# Patient Record
Sex: FEMALE | Race: WHITE | NOT HISPANIC OR LATINO | Employment: FULL TIME | URBAN - METROPOLITAN AREA
[De-identification: names, ages, dates, MRNs, and addresses within clinical notes are randomized per-mention and may not be internally consistent; named-entity substitution may affect disease eponyms.]

---

## 2019-05-07 ENCOUNTER — HISTORICAL (OUTPATIENT)
Dept: LAB | Facility: HOSPITAL | Age: 29
End: 2019-05-07

## 2019-05-07 LAB — B-HCG SERPL QL: NEGATIVE

## 2019-06-10 ENCOUNTER — HISTORICAL (OUTPATIENT)
Dept: LAB | Facility: HOSPITAL | Age: 29
End: 2019-06-10

## 2019-06-10 LAB
ABS NEUT (OLG): 3.64 X10(3)/MCL (ref 2.1–9.2)
ALBUMIN SERPL-MCNC: 3.7 GM/DL (ref 3.4–5)
ALBUMIN/GLOB SERPL: 1.4 RATIO (ref 1.1–2)
ALP SERPL-CCNC: 62 UNIT/L (ref 38–126)
ALT SERPL-CCNC: 24 UNIT/L (ref 12–78)
B-HCG SERPL QL: NEGATIVE
BASOPHILS # BLD AUTO: 0 X10(3)/MCL (ref 0–0.2)
BASOPHILS NFR BLD AUTO: 0 %
BILIRUB SERPL-MCNC: 0.3 MG/DL (ref 0.2–1)
BILIRUBIN DIRECT+TOT PNL SERPL-MCNC: 0.1 MG/DL (ref 0–0.5)
BILIRUBIN DIRECT+TOT PNL SERPL-MCNC: 0.2 MG/DL (ref 0–0.8)
BUN SERPL-MCNC: 13 MG/DL (ref 7–18)
CALCIUM SERPL-MCNC: 8.9 MG/DL (ref 8.5–10.1)
CHLORIDE SERPL-SCNC: 108 MMOL/L (ref 98–107)
CHOLEST SERPL-MCNC: 135 MG/DL (ref 0–200)
CK SERPL-CCNC: 354 UNIT/L (ref 26–192)
CO2 SERPL-SCNC: 25 MMOL/L (ref 21–32)
CREAT SERPL-MCNC: 0.88 MG/DL (ref 0.55–1.02)
CREAT/UREA NIT SERPL: 14.8
EOSINOPHIL # BLD AUTO: 0.3 X10(3)/MCL (ref 0–0.9)
EOSINOPHIL NFR BLD AUTO: 5 %
ERYTHROCYTE [DISTWIDTH] IN BLOOD BY AUTOMATED COUNT: 14.1 % (ref 11.5–17)
GGT SERPL-CCNC: 11 UNIT/L (ref 5–85)
GLOBULIN SER-MCNC: 2.6 GM/DL (ref 2.4–3.5)
GLUCOSE SERPL-MCNC: 78 MG/DL (ref 74–106)
HCT VFR BLD AUTO: 39.3 % (ref 37–47)
HGB BLD-MCNC: 12.2 GM/DL (ref 12–16)
IRON SERPL-MCNC: 149 MCG/DL (ref 50–175)
LDH SERPL-CCNC: 218 UNIT/L (ref 84–246)
LYMPHOCYTES # BLD AUTO: 1.7 X10(3)/MCL (ref 0.6–4.6)
LYMPHOCYTES NFR BLD AUTO: 28 %
MCH RBC QN AUTO: 29 PG (ref 27–31)
MCHC RBC AUTO-ENTMCNC: 31 GM/DL (ref 33–36)
MCV RBC AUTO: 93.6 FL (ref 80–94)
MONOCYTES # BLD AUTO: 0.4 X10(3)/MCL (ref 0.1–1.3)
MONOCYTES NFR BLD AUTO: 6 %
NEUTROPHILS # BLD AUTO: 3.64 X10(3)/MCL (ref 2.1–9.2)
NEUTROPHILS NFR BLD AUTO: 60 %
PHOSPHATE SERPL-MCNC: 4.1 MG/DL (ref 2.5–4.9)
PLATELET # BLD AUTO: 205 X10(3)/MCL (ref 130–400)
PMV BLD AUTO: 10.5 FL (ref 9.4–12.4)
POTASSIUM SERPL-SCNC: 4.7 MMOL/L (ref 3.5–5.1)
PROT SERPL-MCNC: 6.3 GM/DL (ref 6.4–8.2)
RBC # BLD AUTO: 4.2 X10(6)/MCL (ref 4.2–5.4)
SODIUM SERPL-SCNC: 141 MMOL/L (ref 136–145)
TRIGL SERPL-MCNC: 209 MG/DL (ref 30–150)
URATE SERPL-MCNC: 5.1 MG/DL (ref 2.6–7.2)
WBC # SPEC AUTO: 6.1 X10(3)/MCL (ref 4.5–11.5)

## 2022-04-09 ENCOUNTER — HISTORICAL (OUTPATIENT)
Dept: ADMINISTRATIVE | Facility: HOSPITAL | Age: 32
End: 2022-04-09

## 2022-04-29 VITALS
HEIGHT: 63 IN | BODY MASS INDEX: 22.61 KG/M2 | SYSTOLIC BLOOD PRESSURE: 110 MMHG | DIASTOLIC BLOOD PRESSURE: 84 MMHG | WEIGHT: 127.63 LBS

## 2022-06-14 PROBLEM — F42.9 OBSESSIVE-COMPULSIVE DISORDER: Status: ACTIVE | Noted: 2022-06-14

## 2022-06-14 PROBLEM — R51.9 HEADACHE: Status: ACTIVE | Noted: 2022-06-14

## 2022-06-14 PROBLEM — F31.9 BIPOLAR I DISORDER: Status: ACTIVE | Noted: 2022-06-14

## 2022-06-14 PROBLEM — F32.A DEPRESSIVE DISORDER: Status: ACTIVE | Noted: 2022-06-14

## 2022-06-14 PROBLEM — K83.8 BILIARY SLUDGE: Status: ACTIVE | Noted: 2022-06-14

## 2022-06-14 PROBLEM — G47.26 CIRCADIAN RHYTHM SLEEP DISORDER, SHIFT WORK TYPE: Status: ACTIVE | Noted: 2022-06-14

## 2022-06-14 PROBLEM — R53.83 FATIGUE: Status: ACTIVE | Noted: 2022-06-14

## 2022-06-14 PROBLEM — F41.1 GENERALIZED ANXIETY DISORDER: Status: ACTIVE | Noted: 2022-06-14

## 2022-06-14 PROBLEM — R03.0 ELEVATED BLOOD PRESSURE READING WITHOUT DIAGNOSIS OF HYPERTENSION: Status: ACTIVE | Noted: 2022-06-14

## 2022-06-14 PROBLEM — L70.9 ACNE: Status: ACTIVE | Noted: 2022-06-14

## 2024-08-12 ENCOUNTER — OFFICE VISIT (OUTPATIENT)
Dept: URGENT CARE | Facility: CLINIC | Age: 34
End: 2024-08-12
Payer: MEDICAID

## 2024-08-12 ENCOUNTER — HOSPITAL ENCOUNTER (OUTPATIENT)
Dept: RADIOLOGY | Facility: HOSPITAL | Age: 34
Discharge: HOME | End: 2024-08-12
Payer: MEDICAID

## 2024-08-12 VITALS
HEART RATE: 68 BPM | DIASTOLIC BLOOD PRESSURE: 87 MMHG | SYSTOLIC BLOOD PRESSURE: 130 MMHG | RESPIRATION RATE: 20 BRPM | HEIGHT: 63 IN | WEIGHT: 137 LBS | TEMPERATURE: 99 F | OXYGEN SATURATION: 98 % | BODY MASS INDEX: 24.27 KG/M2

## 2024-08-12 DIAGNOSIS — L73.9 FOLLICULITIS: ICD-10-CM

## 2024-08-12 DIAGNOSIS — S69.91XA HAND INJURY, RIGHT, INITIAL ENCOUNTER: ICD-10-CM

## 2024-08-12 DIAGNOSIS — S62.644A CLOSED NONDISPLACED FRACTURE OF PROXIMAL PHALANX OF RIGHT RING FINGER, INITIAL ENCOUNTER: Primary | ICD-10-CM

## 2024-08-12 PROCEDURE — 73130 X-RAY EXAM OF HAND: CPT | Mod: TC,RT

## 2024-08-12 PROCEDURE — 99214 OFFICE O/P EST MOD 30 MIN: CPT | Mod: S$PBB,,,

## 2024-08-12 PROCEDURE — 29125 APPL SHORT ARM SPLINT STATIC: CPT | Mod: PBBFAC

## 2024-08-12 PROCEDURE — 99215 OFFICE O/P EST HI 40 MIN: CPT | Mod: PBBFAC,25

## 2024-08-12 RX ORDER — MUPIROCIN 20 MG/G
OINTMENT TOPICAL 3 TIMES DAILY
Qty: 1 G | Refills: 0 | Status: SHIPPED | OUTPATIENT
Start: 2024-08-12 | End: 2024-08-19

## 2024-08-12 RX ORDER — IBUPROFEN 800 MG/1
800 TABLET ORAL EVERY 8 HOURS PRN
Qty: 21 TABLET | Refills: 0 | Status: SHIPPED | OUTPATIENT
Start: 2024-08-12 | End: 2024-08-19

## 2024-08-12 NOTE — PROGRESS NOTES
"Subjective:       Patient ID: Danii Coleman is a 34 y.o. female.    Vitals:  height is 5' 3" (1.6 m) and weight is 62.1 kg (137 lb). Her oral temperature is 99.2 °F (37.3 °C). Her blood pressure is 130/87 and her pulse is 68. Her respiration is 20 and oxygen saturation is 98%.     Chief Complaint: Hand Pain (Patient has injury to right 2 middle fingers. Patient reports being pulled by her dog when putting her in the car. Swelling and redness noted to right hand.)    35 y/o white female reports right hand injury x 2 days ago while walking dog. She is right hand dominant. States pulling injury from dog leash which resulted in injury. She complains of right 4th digit pain and swelling, hx of prior hand surgery.     Also reports ingrown hair bump on labia > 6 months with waxing and wanning symptoms. She denies any concerns for STDs. States bump appeared after shaving practices.         Musculoskeletal:  Positive for pain, trauma, joint pain, joint swelling and abnormal ROM of joint.   Skin:  Positive for abscess.   Neurological:  Negative for numbness and tingling.       Objective:      Physical Exam   Constitutional: She appears well-developed. She is cooperative. She is easily aroused.  Non-toxic appearance. She does not appear ill. normal and well-groomedawake  HENT:   Head: Normocephalic and atraumatic.   Mouth/Throat: Mucous membranes are normal.   Eyes: Lids are normal.   Abdominal: Normal appearance.   Genitourinary:          Pelvic exam was performed with patient supine.     Musculoskeletal:         General: Swelling, tenderness, deformity and signs of injury present.      Right hand: She exhibits normal capillary refill. Decreased sensation is not present in the ulnar distribution and is not present in the radial distribution. Right ring finger: Exhibits decreased ROM, swelling, tenderness, ecchymosis and deformity. There is tenderness of the MCP joint. Motor /Testing: Ring Finger: 3/5.      Comments: Right " dorsal hand swelling, a few abrasions noted, right 4th digit has deformity with decrease ROM, patient is guarding of digit.    Neurological: She is alert and easily aroused.   Skin: Skin is not diaphoretic.   Nursing note and vitals reviewed.        Assessment:       1. Closed nondisplaced fracture of proximal phalanx of right ring finger, initial encounter    2. Folliculitis    3. Hand injury, right, initial encounter          Plan:     XR images revived, appears to have an acute proximal phalanx fracture of right 4th digit, patient placed in ulna gutter ortho glass, neurovascular intact, ortho referral requested. Radiologist official read is pending.   Splinting education performed, encouraged to take Tylenol/Motrin as directed. Return to clinic as needed.     Closed nondisplaced fracture of proximal phalanx of right ring finger, initial encounter  -     Cancel: Ambulatory referral/consult to Orthopedics  -     Splint application  -     ibuprofen (ADVIL,MOTRIN) 800 MG tablet; Take 1 tablet (800 mg total) by mouth every 8 (eight) hours as needed for Pain.  Dispense: 21 tablet; Refill: 0  -     Ambulatory referral/consult to Orthopedics    Folliculitis  -     mupirocin (BACTROBAN) 2 % ointment; Apply topically 3 (three) times daily. for 7 days  Dispense: 1 g; Refill: 0    Hand injury, right, initial encounter  -     XR HAND COMPLETE 3 VIEW RIGHT; Future; Expected date: 08/12/2024

## 2024-08-12 NOTE — PROCEDURES
Splint application    Date/Time: 8/12/2024 10:00 AM    Performed by: Courtney Guadalupe FNP  Authorized by: Courtney Guadalupe FNP  Consent Done: Not Needed  Location details: right ring finger  Splint type: ulnar gutter  Supplies used: cotton padding, Ortho-Glass and elastic bandage  Post-procedure: The splinted body part was neurovascularly unchanged following the procedure.  Patient tolerance: Patient tolerated the procedure well with no immediate complications  Comments: 4th & 5th digit immobilized.

## 2024-08-13 DIAGNOSIS — S62.644A: Primary | ICD-10-CM

## 2024-08-14 ENCOUNTER — ANESTHESIA EVENT (OUTPATIENT)
Dept: SURGERY | Facility: HOSPITAL | Age: 34
End: 2024-08-14
Payer: MEDICAID

## 2024-08-14 ENCOUNTER — OFFICE VISIT (OUTPATIENT)
Dept: ORTHOPEDICS | Facility: CLINIC | Age: 34
End: 2024-08-14
Payer: MEDICAID

## 2024-08-14 VITALS
SYSTOLIC BLOOD PRESSURE: 98 MMHG | BODY MASS INDEX: 24.18 KG/M2 | HEART RATE: 94 BPM | HEIGHT: 63 IN | DIASTOLIC BLOOD PRESSURE: 59 MMHG | WEIGHT: 136.44 LBS | TEMPERATURE: 98 F

## 2024-08-14 DIAGNOSIS — S62.614A CLOSED DISPLACED FRACTURE OF PROXIMAL PHALANX OF RIGHT RING FINGER: ICD-10-CM

## 2024-08-14 DIAGNOSIS — S62.644A CLOSED NONDISPLACED FRACTURE OF PROXIMAL PHALANX OF RIGHT RING FINGER, INITIAL ENCOUNTER: Primary | ICD-10-CM

## 2024-08-14 PROCEDURE — 99214 OFFICE O/P EST MOD 30 MIN: CPT | Mod: PBBFAC

## 2024-08-14 RX ORDER — MUPIROCIN 20 MG/G
OINTMENT TOPICAL
OUTPATIENT
Start: 2024-08-14

## 2024-08-14 RX ORDER — CEFAZOLIN SODIUM 2 G/50ML
2 SOLUTION INTRAVENOUS
Status: CANCELLED | OUTPATIENT
Start: 2024-08-14

## 2024-08-14 RX ORDER — SODIUM CHLORIDE 9 MG/ML
INJECTION, SOLUTION INTRAVENOUS CONTINUOUS
Status: CANCELLED | OUTPATIENT
Start: 2024-08-14

## 2024-08-14 NOTE — PROGRESS NOTES
Faculty Attestation: Danii Coleman  was seen at [facility] in the Orthopaedic Clinic. Patient seen and evaluated at the time of the visit. History of Present Illness, Physical Exam, and Assessment and Plan reviewed. Treatment plan is reasonable and appropriate. Compliance with treatment recommendations is important. No procedure was performed.     Julio Cesar Phelps MD  Orthopaedic Surgery

## 2024-08-14 NOTE — ANESTHESIA PREPROCEDURE EVALUATION
"Danii Coleman is a 34 y.o. female presenting FOR ORIF, FINGER (Right: Finger) with a history of   -Closed nondisplaced fracture of proximal phalanx of right ring finger       -h/o sx for right 5th metacarpal boxer's fracture in 2013 and had that hardware removed at a later date     -ANXIETY/DEPRESSION/OCD/bipolar d/o  -2 cervical and 1 lumbar herniation     BETA-BLOCKER: NONE    New Orders for Anesthesia: UPT    Patient Active Problem List   Diagnosis    Obsessive-compulsive disorder    Headache    Fatigue    Elevated blood pressure reading without diagnosis of hypertension    Depression    Circadian rhythm sleep disorder, shift work type    Bipolar I disorder    Biliary sludge    Acne    Generalized anxiety disorder       Pre-op Assessment    I have reviewed the NPO Status.      Review of Systems  Anesthesia Hx:  No problems with previous Anesthesia                Social:  Recreational Drugs       Cardiovascular:  Cardiovascular Normal                                            Pulmonary:  Pulmonary Normal                       Renal/:  Renal/ Normal                 Hepatic/GI:  Hepatic/GI Normal                 Neurological:  Neurology Normal                                      Endocrine:  Endocrine Normal            Psych:  Psychiatric History anxiety depression              Vitals:    08/14/24 1402 08/15/24 0745 08/15/24 0759 08/15/24 0840   BP:   118/67 120/73   BP Location:    Left arm   Patient Position:    Sitting   Pulse:   72 66   Resp:   16 17   Temp:   36.8 °C (98.3 °F) 37 °C (98.6 °F)   TempSrc:   Oral Tympanic   SpO2:   97% 99%   Weight: 61.2 kg (135 lb) 60.7 kg (133 lb 12.8 oz)     Height: 5' 4" (1.626 m)            Physical Exam  General: Alert, Cooperative and Well nourished    Airway:  Mallampati: II   Mouth Opening: Normal  TM Distance: Normal  Tongue: Normal  Neck ROM: Normal ROM    Dental:  Intact    Chest/Lungs:  Clear to auscultation, Normal Respiratory Rate    Heart:  Rate: " Normal  Rhythm: Regular Rhythm  Sounds: Normal       Latest Reference Range & Units 08/15/24 08:06   hCG Qualitative, Urine Negative  Negative     Lab Results   Component Value Date    WBC 6.1 06/10/2019    HGB 12.2 06/10/2019    HCT 39.3 06/10/2019    MCV 93.6 06/10/2019     06/10/2019       CMP  Sodium   Date Value Ref Range Status   06/10/2019 141 136 - 145 mmol/L Final     Potassium   Date Value Ref Range Status   06/10/2019 4.7 3.5 - 5.1 mmol/L Final     Chloride   Date Value Ref Range Status   06/10/2019 108 (H) 98 - 107 mmol/L Final     CO2   Date Value Ref Range Status   06/10/2019 25.0 21.0 - 32.0 mmol/L Final     Blood Urea Nitrogen   Date Value Ref Range Status   06/10/2019 13.0 7.0 - 18.0 mg/dL Final     Creatinine   Date Value Ref Range Status   06/10/2019 0.88 0.55 - 1.02 mg/dL Final     Calcium   Date Value Ref Range Status   06/10/2019 8.9 8.5 - 10.1 mg/dL Final     Albumin   Date Value Ref Range Status   06/10/2019 3.70 3.40 - 5.00 gm/dL Final     Bilirubin Total   Date Value Ref Range Status   06/10/2019 0.3 0.2 - 1.0 mg/dL Final     ALP   Date Value Ref Range Status   06/10/2019 62 38 - 126 unit/L Final     ALT   Date Value Ref Range Status   06/10/2019 24 12 - 78 unit/L Final         Anesthesia Plan  Type of Anesthesia, risks & benefits discussed:    Anesthesia Type: Gen Supraglottic Airway  Intra-op Monitoring Plan: Standard ASA Monitors  Post Op Pain Control Plan: IV/PO Opioids PRN  Induction:  IV  Airway Plan: Direct  Informed Consent: Informed consent signed with the Patient and all parties understand the risks and agree with anesthesia plan.  All questions answered.   ASA Score: 2  Day of Surgery Review of History & Physical: H&P Update referred to the surgeon/provider.    Ready For Surgery From Anesthesia Perspective.     .

## 2024-08-14 NOTE — H&P (VIEW-ONLY)
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  Patient reports on 08/10/2024 she injured her right hand while walking her dog.  She presented to the emergency department where imaging confirmed a fracture of the proximal phalanx of the right ring finger.  She was placed into a splint and given follow up in our clinic.  She did have previous surgery in 2013 of the right 5th metacarpal for a boxer's fracture and had that hardware removed at a later date.  No open wounds at the time of injury.  No other injuries.      PMH:   Past Medical History:   Diagnosis Date    Acne     Anxiety disorder, unspecified     Depression     Elevated BP without diagnosis of hypertension     Fatigue     Gallbladder sludge     Headache     OCD (obsessive compulsive disorder)     Shift work sleep disorder        PSH:   Past Surgical History:   Procedure Laterality Date    breast lift      HAND SURGERY Right 2013       SH:   Social History     Socioeconomic History    Marital status: Single   Tobacco Use    Smoking status: Former    Smokeless tobacco: Never   Substance and Sexual Activity    Alcohol use: Not Currently    Drug use: Never    Sexual activity: Yes     Partners: Male       FH:   Family History   Problem Relation Name Age of Onset    Arthritis Mother      Goiter Mother      Osteoporosis Mother         Allergies:   Review of patient's allergies indicates:   Allergen Reactions    Milk containing products (dairy)        ROS:  Constitutional- no fever, fatigue, weakness  Eye- no vision loss, eye redness, drainage, or pain  ENMT- no sore throat, ear pain, sinus pain/congestion, nasal congestion/drainage  Respiratory- no cough, wheezing, or shortness of breath  CV- no chest pain, no palpitations, no edema  GI- no N/V/D; no abdominal pain    Physical Exam:  Vitals:    08/14/24 1245   BP: (!) 98/59   Pulse: 94    Temp: 97.6 °F (36.4 °C)       General: NAD  Cardio: RRR by peripheral pulse  Pulm: Normal WOB on room air, symmetric chest rise  Abd: Soft, NT/ND    MSK:  Right hand:   No open wounds or superficial abrasions.  Bruising about the ring finger and swelling.  Not able to fully flex the ring finger but able to flex at the PIP and DIP.  Extensor lag present.  Sensation intact to light touch throughout.    Imaging:   X-ray right hand:  Comminuted spiral fracture of the right ring finger proximal phalanx near the DIP joint.    Assessment:  34-year-old right-hand dominant female with right ring finger proximal phalanx fracture.    -discussed risks, benefits, and alternatives to operative intervention.  Discussed with the patient we recommend surgery for this injury.  Patient's questions were answered and she elected to proceed with surgery.  We will plan for open reduction internal fixation of the right ring finger proximal phalanx on 08/15/2024 with Dr. Phelps.  Informed consent was obtained today.    Favio Barber MD  [facility] Orthopaedic Surgery  8/14/2024 1:19 PM

## 2024-08-14 NOTE — PROGRESS NOTES
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  Patient reports on 08/10/2024 she injured her right hand while walking her dog.  She presented to the emergency department where imaging confirmed a fracture of the proximal phalanx of the right ring finger.  She was placed into a splint and given follow up in our clinic.  She did have previous surgery in 2013 of the right 5th metacarpal for a boxer's fracture and had that hardware removed at a later date.  No open wounds at the time of injury.  No other injuries.      PMH:   Past Medical History:   Diagnosis Date    Acne     Anxiety disorder, unspecified     Depression     Elevated BP without diagnosis of hypertension     Fatigue     Gallbladder sludge     Headache     OCD (obsessive compulsive disorder)     Shift work sleep disorder        PSH:   Past Surgical History:   Procedure Laterality Date    breast lift      HAND SURGERY Right 2013       SH:   Social History     Socioeconomic History    Marital status: Single   Tobacco Use    Smoking status: Former    Smokeless tobacco: Never   Substance and Sexual Activity    Alcohol use: Not Currently    Drug use: Never    Sexual activity: Yes     Partners: Male       FH:   Family History   Problem Relation Name Age of Onset    Arthritis Mother      Goiter Mother      Osteoporosis Mother         Allergies:   Review of patient's allergies indicates:   Allergen Reactions    Milk containing products (dairy)        ROS:  Constitutional- no fever, fatigue, weakness  Eye- no vision loss, eye redness, drainage, or pain  ENMT- no sore throat, ear pain, sinus pain/congestion, nasal congestion/drainage  Respiratory- no cough, wheezing, or shortness of breath  CV- no chest pain, no palpitations, no edema  GI- no N/V/D; no abdominal pain    Physical Exam:  Vitals:    08/14/24 1245   BP: (!) 98/59   Pulse: 94    Temp: 97.6 °F (36.4 °C)       General: NAD  Cardio: RRR by peripheral pulse  Pulm: Normal WOB on room air, symmetric chest rise  Abd: Soft, NT/ND    MSK:  Right hand:   No open wounds or superficial abrasions.  Bruising about the ring finger and swelling.  Not able to fully flex the ring finger but able to flex at the PIP and DIP.  Extensor lag present.  Sensation intact to light touch throughout.    Imaging:   X-ray right hand:  Comminuted spiral fracture of the right ring finger proximal phalanx near the DIP joint.    Assessment:  34-year-old right-hand dominant female with right ring finger proximal phalanx fracture.    -discussed risks, benefits, and alternatives to operative intervention.  Discussed with the patient we recommend surgery for this injury.  Patient's questions were answered and she elected to proceed with surgery.  We will plan for open reduction internal fixation of the right ring finger proximal phalanx on 08/15/2024 with Dr. Phelps.  Informed consent was obtained today.    Favio Barber MD  [facility] Orthopaedic Surgery  8/14/2024 1:19 PM

## 2024-08-15 ENCOUNTER — HOSPITAL ENCOUNTER (OUTPATIENT)
Facility: HOSPITAL | Age: 34
Discharge: HOME | End: 2024-08-15
Attending: ORTHOPAEDIC SURGERY | Admitting: ORTHOPAEDIC SURGERY
Payer: MEDICAID

## 2024-08-15 ENCOUNTER — ANESTHESIA (OUTPATIENT)
Dept: SURGERY | Facility: HOSPITAL | Age: 34
End: 2024-08-15
Payer: MEDICAID

## 2024-08-15 VITALS
TEMPERATURE: 98 F | HEIGHT: 64 IN | DIASTOLIC BLOOD PRESSURE: 84 MMHG | WEIGHT: 133.81 LBS | BODY MASS INDEX: 22.85 KG/M2 | SYSTOLIC BLOOD PRESSURE: 131 MMHG | HEART RATE: 80 BPM | RESPIRATION RATE: 16 BRPM | OXYGEN SATURATION: 97 %

## 2024-08-15 DIAGNOSIS — S62.644A CLOSED NONDISPLACED FRACTURE OF PROXIMAL PHALANX OF RIGHT RING FINGER, INITIAL ENCOUNTER: ICD-10-CM

## 2024-08-15 DIAGNOSIS — S62.614A CLOSED DISPLACED FRACTURE OF PROXIMAL PHALANX OF RIGHT RING FINGER, INITIAL ENCOUNTER: Primary | ICD-10-CM

## 2024-08-15 DIAGNOSIS — S62.614A CLOSED DISPLACED FRACTURE OF PROXIMAL PHALANX OF RIGHT RING FINGER: ICD-10-CM

## 2024-08-15 LAB
B-HCG UR QL: NEGATIVE
CTP QC/QA: YES

## 2024-08-15 PROCEDURE — 27201423 OPTIME MED/SURG SUP & DEVICES STERILE SUPPLY: Performed by: ORTHOPAEDIC SURGERY

## 2024-08-15 PROCEDURE — 25000003 PHARM REV CODE 250: Performed by: ANESTHESIOLOGY

## 2024-08-15 PROCEDURE — 63600175 PHARM REV CODE 636 W HCPCS: Performed by: NURSE ANESTHETIST, CERTIFIED REGISTERED

## 2024-08-15 PROCEDURE — 26735 TREAT FINGER FRACTURE EACH: CPT | Mod: F8,,, | Performed by: ORTHOPAEDIC SURGERY

## 2024-08-15 PROCEDURE — 63600175 PHARM REV CODE 636 W HCPCS: Performed by: STUDENT IN AN ORGANIZED HEALTH CARE EDUCATION/TRAINING PROGRAM

## 2024-08-15 PROCEDURE — 71000015 HC POSTOP RECOV 1ST HR: Performed by: ORTHOPAEDIC SURGERY

## 2024-08-15 PROCEDURE — 81025 URINE PREGNANCY TEST: CPT | Performed by: NURSE PRACTITIONER

## 2024-08-15 PROCEDURE — 71000033 HC RECOVERY, INTIAL HOUR: Performed by: ORTHOPAEDIC SURGERY

## 2024-08-15 PROCEDURE — 25000003 PHARM REV CODE 250: Performed by: NURSE ANESTHETIST, CERTIFIED REGISTERED

## 2024-08-15 PROCEDURE — 36000709 HC OR TIME LEV III EA ADD 15 MIN: Performed by: ORTHOPAEDIC SURGERY

## 2024-08-15 PROCEDURE — 71000016 HC POSTOP RECOV ADDL HR: Performed by: ORTHOPAEDIC SURGERY

## 2024-08-15 PROCEDURE — 37000008 HC ANESTHESIA 1ST 15 MINUTES: Performed by: ORTHOPAEDIC SURGERY

## 2024-08-15 PROCEDURE — 37000009 HC ANESTHESIA EA ADD 15 MINS: Performed by: ORTHOPAEDIC SURGERY

## 2024-08-15 PROCEDURE — 36000708 HC OR TIME LEV III 1ST 15 MIN: Performed by: ORTHOPAEDIC SURGERY

## 2024-08-15 PROCEDURE — C1713 ANCHOR/SCREW BN/BN,TIS/BN: HCPCS | Performed by: ORTHOPAEDIC SURGERY

## 2024-08-15 PROCEDURE — 63600175 PHARM REV CODE 636 W HCPCS

## 2024-08-15 PROCEDURE — 63600175 PHARM REV CODE 636 W HCPCS: Performed by: ANESTHESIOLOGY

## 2024-08-15 DEVICE — IMPLANTABLE DEVICE: Type: IMPLANTABLE DEVICE | Site: FINGER | Status: FUNCTIONAL

## 2024-08-15 RX ORDER — MORPHINE SULFATE 2 MG/ML
2 INJECTION, SOLUTION INTRAMUSCULAR; INTRAVENOUS EVERY 5 MIN PRN
Status: DISCONTINUED | OUTPATIENT
Start: 2024-08-15 | End: 2024-08-15

## 2024-08-15 RX ORDER — HYDROMORPHONE HYDROCHLORIDE 1 MG/ML
0.5 INJECTION, SOLUTION INTRAMUSCULAR; INTRAVENOUS; SUBCUTANEOUS EVERY 5 MIN PRN
Status: DISCONTINUED | OUTPATIENT
Start: 2024-08-15 | End: 2024-08-15 | Stop reason: HOSPADM

## 2024-08-15 RX ORDER — ONDANSETRON 4 MG/1
4 TABLET, FILM COATED ORAL EVERY 8 HOURS PRN
Qty: 15 TABLET | Refills: 0 | Status: SHIPPED | OUTPATIENT
Start: 2024-08-15 | End: 2024-08-22

## 2024-08-15 RX ORDER — MEPERIDINE HYDROCHLORIDE 25 MG/ML
12.5 INJECTION INTRAMUSCULAR; INTRAVENOUS; SUBCUTANEOUS EVERY 10 MIN PRN
Status: DISCONTINUED | OUTPATIENT
Start: 2024-08-15 | End: 2024-08-15 | Stop reason: HOSPADM

## 2024-08-15 RX ORDER — GLUCAGON 1 MG
1 KIT INJECTION
Status: DISCONTINUED | OUTPATIENT
Start: 2024-08-15 | End: 2024-08-15 | Stop reason: HOSPADM

## 2024-08-15 RX ORDER — OXYCODONE HYDROCHLORIDE 5 MG/1
5 TABLET ORAL EVERY 6 HOURS PRN
Qty: 25 TABLET | Refills: 0 | Status: SHIPPED | OUTPATIENT
Start: 2024-08-15

## 2024-08-15 RX ORDER — MIDAZOLAM HYDROCHLORIDE 2 MG/2ML
2 INJECTION, SOLUTION INTRAMUSCULAR; INTRAVENOUS ONCE AS NEEDED
Status: COMPLETED | OUTPATIENT
Start: 2024-08-15 | End: 2024-08-15

## 2024-08-15 RX ORDER — PROPOFOL 10 MG/ML
VIAL (ML) INTRAVENOUS
Status: DISCONTINUED | OUTPATIENT
Start: 2024-08-15 | End: 2024-08-15

## 2024-08-15 RX ORDER — PROMETHAZINE HYDROCHLORIDE 25 MG/ML
INJECTION, SOLUTION INTRAMUSCULAR; INTRAVENOUS
Status: COMPLETED
Start: 2024-08-15 | End: 2024-08-15

## 2024-08-15 RX ORDER — PROMETHAZINE HYDROCHLORIDE 25 MG/ML
6.25 INJECTION, SOLUTION INTRAMUSCULAR; INTRAVENOUS ONCE
Status: COMPLETED | OUTPATIENT
Start: 2024-08-15 | End: 2024-08-15

## 2024-08-15 RX ORDER — ACETAMINOPHEN 500 MG
1000 TABLET ORAL EVERY 8 HOURS
Qty: 90 TABLET | Refills: 0 | Status: SHIPPED | OUTPATIENT
Start: 2024-08-15

## 2024-08-15 RX ORDER — FENTANYL CITRATE 50 UG/ML
INJECTION, SOLUTION INTRAMUSCULAR; INTRAVENOUS
Status: DISCONTINUED | OUTPATIENT
Start: 2024-08-15 | End: 2024-08-15

## 2024-08-15 RX ORDER — OXYCODONE HYDROCHLORIDE 5 MG/1
5 TABLET ORAL
Status: DISCONTINUED | OUTPATIENT
Start: 2024-08-15 | End: 2024-08-15 | Stop reason: HOSPADM

## 2024-08-15 RX ORDER — SODIUM CHLORIDE, SODIUM LACTATE, POTASSIUM CHLORIDE, CALCIUM CHLORIDE 600; 310; 30; 20 MG/100ML; MG/100ML; MG/100ML; MG/100ML
INJECTION, SOLUTION INTRAVENOUS CONTINUOUS
Status: DISCONTINUED | OUTPATIENT
Start: 2024-08-15 | End: 2024-08-15 | Stop reason: HOSPADM

## 2024-08-15 RX ORDER — CEFAZOLIN SODIUM 1 G/3ML
2 INJECTION, POWDER, FOR SOLUTION INTRAMUSCULAR; INTRAVENOUS
Status: COMPLETED | OUTPATIENT
Start: 2024-08-15 | End: 2024-08-15

## 2024-08-15 RX ORDER — LIDOCAINE HYDROCHLORIDE 20 MG/ML
INJECTION INTRAVENOUS
Status: DISCONTINUED | OUTPATIENT
Start: 2024-08-15 | End: 2024-08-15

## 2024-08-15 RX ORDER — HYDROMORPHONE HYDROCHLORIDE 1 MG/ML
INJECTION, SOLUTION INTRAMUSCULAR; INTRAVENOUS; SUBCUTANEOUS
Status: COMPLETED
Start: 2024-08-15 | End: 2024-08-15

## 2024-08-15 RX ORDER — ONDANSETRON HYDROCHLORIDE 2 MG/ML
INJECTION, SOLUTION INTRAVENOUS
Status: DISCONTINUED | OUTPATIENT
Start: 2024-08-15 | End: 2024-08-15

## 2024-08-15 RX ORDER — ONDANSETRON HYDROCHLORIDE 2 MG/ML
4 INJECTION, SOLUTION INTRAVENOUS DAILY PRN
Status: DISCONTINUED | OUTPATIENT
Start: 2024-08-15 | End: 2024-08-15 | Stop reason: HOSPADM

## 2024-08-15 RX ORDER — SODIUM CHLORIDE 9 MG/ML
INJECTION, SOLUTION INTRAVENOUS CONTINUOUS
Status: DISCONTINUED | OUTPATIENT
Start: 2024-08-15 | End: 2024-08-15 | Stop reason: HOSPADM

## 2024-08-15 RX ORDER — KETOROLAC TROMETHAMINE 30 MG/ML
INJECTION, SOLUTION INTRAMUSCULAR; INTRAVENOUS
Status: DISCONTINUED | OUTPATIENT
Start: 2024-08-15 | End: 2024-08-15

## 2024-08-15 RX ORDER — HYDROMORPHONE HYDROCHLORIDE 1 MG/ML
INJECTION, SOLUTION INTRAMUSCULAR; INTRAVENOUS; SUBCUTANEOUS
Status: DISCONTINUED
Start: 2024-08-15 | End: 2024-08-15 | Stop reason: HOSPADM

## 2024-08-15 RX ORDER — GABAPENTIN 300 MG/1
300 CAPSULE ORAL 3 TIMES DAILY
Qty: 90 CAPSULE | Refills: 0 | Status: SHIPPED | OUTPATIENT
Start: 2024-08-15 | End: 2025-08-15

## 2024-08-15 RX ORDER — DEXAMETHASONE SODIUM PHOSPHATE 4 MG/ML
INJECTION, SOLUTION INTRA-ARTICULAR; INTRALESIONAL; INTRAMUSCULAR; INTRAVENOUS; SOFT TISSUE
Status: DISCONTINUED | OUTPATIENT
Start: 2024-08-15 | End: 2024-08-15

## 2024-08-15 RX ORDER — SODIUM CHLORIDE 0.9 % (FLUSH) 0.9 %
10 SYRINGE (ML) INJECTION
Status: DISCONTINUED | OUTPATIENT
Start: 2024-08-15 | End: 2024-08-15 | Stop reason: HOSPADM

## 2024-08-15 RX ADMIN — MORPHINE SULFATE 2 MG: 2 INJECTION, SOLUTION INTRAMUSCULAR; INTRAVENOUS at 10:08

## 2024-08-15 RX ADMIN — CEFAZOLIN 2 G: 330 INJECTION, POWDER, FOR SOLUTION INTRAMUSCULAR; INTRAVENOUS at 09:08

## 2024-08-15 RX ADMIN — SODIUM CHLORIDE, POTASSIUM CHLORIDE, SODIUM LACTATE AND CALCIUM CHLORIDE: 600; 310; 30; 20 INJECTION, SOLUTION INTRAVENOUS at 09:08

## 2024-08-15 RX ADMIN — Medication 150 MG: at 09:08

## 2024-08-15 RX ADMIN — ONDANSETRON 4 MG: 2 INJECTION INTRAMUSCULAR; INTRAVENOUS at 09:08

## 2024-08-15 RX ADMIN — MEPERIDINE HYDROCHLORIDE 12.5 MG: 25 INJECTION INTRAMUSCULAR; INTRAVENOUS; SUBCUTANEOUS at 10:08

## 2024-08-15 RX ADMIN — HYDROMORPHONE HYDROCHLORIDE 0.5 MG: 1 INJECTION, SOLUTION INTRAMUSCULAR; INTRAVENOUS; SUBCUTANEOUS at 11:08

## 2024-08-15 RX ADMIN — HYDROMORPHONE HYDROCHLORIDE 0.5 MG: 1 INJECTION, SOLUTION INTRAMUSCULAR; INTRAVENOUS; SUBCUTANEOUS at 10:08

## 2024-08-15 RX ADMIN — LIDOCAINE HYDROCHLORIDE 50 MG: 20 INJECTION INTRAVENOUS at 09:08

## 2024-08-15 RX ADMIN — KETOROLAC TROMETHAMINE 30 MG: 30 INJECTION, SOLUTION INTRAMUSCULAR at 09:08

## 2024-08-15 RX ADMIN — FENTANYL CITRATE 50 MCG: 50 INJECTION INTRAMUSCULAR; INTRAVENOUS at 09:08

## 2024-08-15 RX ADMIN — OXYCODONE HYDROCHLORIDE 5 MG: 5 TABLET ORAL at 11:08

## 2024-08-15 RX ADMIN — PROMETHAZINE HYDROCHLORIDE 6.25 MG: 25 INJECTION, SOLUTION INTRAMUSCULAR; INTRAVENOUS at 10:08

## 2024-08-15 RX ADMIN — DEXAMETHASONE SODIUM PHOSPHATE 8 MG: 4 INJECTION, SOLUTION INTRA-ARTICULAR; INTRALESIONAL; INTRAMUSCULAR; INTRAVENOUS; SOFT TISSUE at 09:08

## 2024-08-15 RX ADMIN — MIDAZOLAM HYDROCHLORIDE 2 MG: 1 INJECTION, SOLUTION INTRAMUSCULAR; INTRAVENOUS at 09:08

## 2024-08-15 RX ADMIN — Medication 50 MG: at 09:08

## 2024-08-15 RX ADMIN — PROMETHAZINE HYDROCHLORIDE 6.25 MG: 25 INJECTION INTRAMUSCULAR; INTRAVENOUS at 10:08

## 2024-08-15 NOTE — INTERVAL H&P NOTE
The patient has been examined and the H&P has been reviewed. I concur with the findings and no changes have occurred since H&P was written. Surgery/Procedure and Anesthesia risks, benefits and alternative options discussed and understood by patient/family.    R RF proximal phalanx fx  Plan - ORIF R RF prox phalanx

## 2024-08-15 NOTE — DISCHARGE INSTRUCTIONS
· Keep follow up appointment at [facility].    · Take pain medication as prescribed.    · Keep arm elevated on pillow.    · No driving or consuming alcohol for the next 24 hours or while taking narcotic pain medicine.    · May apply ice pack to surgical area for 20 minutes at a time 6-8 times per day.    · Dressing: Leave clean and dry until follow up appointment.    · NO LIFTING OR PULLING WITH AFFECTED ARM until cleared by MD.    · Notify MD of any moderate to severe pain unrelieved by pain medicine or for any signs of infection including fever above 100.4, excessive redness or swelling, yellow/green foul- smelling drainage, nausea or vomiting. Call clinic at: [phone]. After business hours, if you are unable to reach a doctor on call at [phone] or your concern is an emergency, call 911 or report to your nearest emergency room.    ·  Thanks for choosing [facility]! Have a smooth recovery!

## 2024-08-15 NOTE — TRANSFER OF CARE
Anesthesia Transfer of Care Note    Patient: Danii Coleman    Procedure(s) Performed: Procedure(s) (LRB):  ORIF, FINGER (Right)    Patient location: PACU    Anesthesia Type: general    Transport from OR: Transported from OR on room air with adequate spontaneous ventilation    Post pain: adequate analgesia    Post assessment: no apparent anesthetic complications and tolerated procedure well    Post vital signs: stable    Level of consciousness: sedated    Nausea/Vomiting: no nausea/vomiting    Complications: none    Transfer of care protocol was followed

## 2024-08-15 NOTE — ANESTHESIA POSTPROCEDURE EVALUATION
Anesthesia Post Evaluation    Patient: Danii Coleman    Procedure(s) Performed: Procedure(s) (LRB):  ORIF, FINGER (Right)    Final Anesthesia Type: general      Patient location during evaluation: DOSC  Post-procedure vital signs: reviewed and stable  Airway patency: patent      Anesthetic complications: no      Cardiovascular status: hemodynamically stable  Respiratory status: spontaneous ventilation  Follow-up not needed.              Vitals Value Taken Time   /78 08/15/24 1201   Temp 36.6 °C (97.9 °F) 08/15/24 1115   Pulse 62 08/15/24 1214   Resp 16 08/15/24 1118   SpO2 96 % 08/15/24 1214   Vitals shown include unfiled device data.      Event Time   Out of Recovery 11:08:00         Pain/Tomás Score: Pain Rating Prior to Med Admin: 7 (8/15/2024 11:18 AM)  Tomás Score: 10 (8/15/2024 11:15 AM)

## 2024-08-15 NOTE — DISCHARGE SUMMARY
[facility]  Discharge Note  Short Stay    Procedure(s) (LRB):  ORIF, FINGER (Right)      OUTCOME: Patient tolerated treatment/procedure well without complication and is now ready for discharge.    DISPOSITION: Home or Self Care    FINAL DIAGNOSIS:  Closed nondisplaced fracture of proximal phalanx of right ring finger    FOLLOWUP: In clinic    DISCHARGE INSTRUCTIONS:  No discharge procedures on file.     TIME SPENT ON DISCHARGE: 5 minutes

## 2024-08-15 NOTE — OP NOTE
OPERATIVE REPORT      Patient: Danii Coleman   : 1990    MRN: [id number]  Date: 08/15/2024      Surgeon: Julio Cesar Phelps MD  Assistant: Hans Alexander, PGY3.  Certified first assist was necessary as a skilled set of hands and was necessary for proper patient positioning as well as assistance with closure in place with multiple implants.  Preoperative Diagnosis:  Right ring finger proximal phalanx fracture  Postoperative Diagnosis: Same  Procedure:  Open reduction fixation right ring finger proximal phalanx fracture  Anesthesiologist: Shawanda Eng MD  OR Staff: Circulator: Amelia Hobbs RN  Scrub Person: Morena Campos ST  Implants:   Implant Name Type Inv. Item Serial No.  Lot No. LRB No. Used Action   non-locking screw 1.3mm    BIOMET  Right 1 Implanted   non-locking screw      Right 2 Implanted     EBL: See anesthesia note  Complications: None  Disposition: To PACU, stable    Indications: Danii Coleman is a 34 y.o. female presenting with the aforementioned injuries/findings. The procedure is indicated to stabilize right ring finger.  Patient was a history an injury to right hand.  Sustaining a fracture of the right ring.  Proximal phalanx fracture.  The patient is awake and alert. After thorough discussion of the risks, benefits, expected outcomes, and alternatives to surgical intervention, the patient agreed to proceed with surgical treatment.  Specific risks discussed included, but were not limited to: superficial or deep infection, wound healing complications, DVT/PE, significant bleeding requiring transfusion, damage to named anatomic structures in the immediate area including named neurovascular structures, infection, nonunion, malunion and general risks of anesthesia.  The patient voiced understanding and written as well as verbal consent was obtained by myself prior to the procedure.    Procedure Note:  The patient was brought back to the OR and placed supine on the OR table. After  successful induction of anesthesia by anesthesia staff, the patient was positioned in the supine position and all bony prominences were padded appropriately.  The surgical field was then provisionally cleansed and then prepped and draped in the usual sterile fashion.    At this time a time-out was performed, with the correct patient, site, and procedure identified.  The universal time out as well as sign your site protocols were followed.  Preoperative antibiotics were verified as administered.     Dorsal incision overlying the extensor tendon was taken through skin subcutaneous tissue.  Extensor tendon was identified.  It was split in line with the fibers.  Carried down to the periosteum.  Periosteum was incised.  It was cleaned off of the proximal phalanx.  Oblique fracture was identified.  It was delivered.  It was interposing soft tissue was cleaned from the fracture sites.  Clamps were placed across the proximal phalanx fracture.  Reduced the fracture anatomically.  11.3 mm screw was placed in lag fashion.  We then placed 2 more screws 1 more proximally 1 more distally again both in lag fashion.  Both 1.3 mm.  Fracture alignment remained anatomic.  Confirmed AP and lateral views that all screws were appropriate length.  All screws had appropriate positioning.  Fracture alignment remained well reduced.  Tourniquet was dropped.  Bleeders coagulated.    The incision(s) was/were then irrigated using copious sterile saline. The surgical wound was closed in layered fashion.  The surgical site(s) was/were were sterilely cleansed and dressed.    The patient was then subsequently transferred to to PACU in a stable condition.    All sponge and needle counts were correct at the end of the case.  I was present and participated in all aspects of the procedure.    Prognosis:  The patient will be kept NWB on the ipsilateral extremity for approximately 6 weeks .  Patient will receive DVT prophylaxis .       This note/OR report  was created with the assistance of  voice recognition software or phone  dictation.  There may be transcription errors as a result of using this technology however minimal. Effort has been made to assure accuracy of transcription but any obvious errors or omissions should be clarified with the author of the document.

## 2024-08-15 NOTE — ANESTHESIA PROCEDURE NOTES
Intubation    Date/Time: 8/15/2024 9:20 AM    Performed by: Radha Viera CRNA  Authorized by: Radha Viera CRNA    Intubation:     Induction:  Intravenous    Intubated:  Postinduction    Mask Ventilation:  Not attempted    Attempts:  1    Attempted By:  CRNA    Difficult Airway Encountered?: No      Complications:  None    Airway Device:  Supraglottic airway/LMA    Airway Device Size:  3.0    Style/Cuff Inflation:  Uncuffed    Placement Verified By:  Capnometry    Complicating Factors:  None    Findings Post-Intubation:  BS equal bilateral and atraumatic/condition of teeth unchanged

## 2024-08-28 ENCOUNTER — HOSPITAL ENCOUNTER (OUTPATIENT)
Dept: RADIOLOGY | Facility: HOSPITAL | Age: 34
Discharge: HOME | End: 2024-08-28
Attending: REHABILITATION UNIT
Payer: MEDICAID

## 2024-08-28 ENCOUNTER — OFFICE VISIT (OUTPATIENT)
Dept: ORTHOPEDICS | Facility: CLINIC | Age: 34
End: 2024-08-28
Payer: MEDICAID

## 2024-08-28 VITALS
SYSTOLIC BLOOD PRESSURE: 115 MMHG | OXYGEN SATURATION: 98 % | DIASTOLIC BLOOD PRESSURE: 77 MMHG | HEART RATE: 75 BPM | RESPIRATION RATE: 18 BRPM | BODY MASS INDEX: 23.56 KG/M2 | TEMPERATURE: 99 F | HEIGHT: 64 IN | WEIGHT: 138 LBS

## 2024-08-28 DIAGNOSIS — M79.641 RIGHT HAND PAIN: ICD-10-CM

## 2024-08-28 DIAGNOSIS — M79.641 RIGHT HAND PAIN: Primary | ICD-10-CM

## 2024-08-28 PROCEDURE — 1159F MED LIST DOCD IN RCRD: CPT | Mod: CPTII,,, | Performed by: REHABILITATION UNIT

## 2024-08-28 PROCEDURE — 73130 X-RAY EXAM OF HAND: CPT | Mod: TC,RT

## 2024-08-28 PROCEDURE — 99024 POSTOP FOLLOW-UP VISIT: CPT | Mod: ,,, | Performed by: REHABILITATION UNIT

## 2024-08-28 PROCEDURE — 3074F SYST BP LT 130 MM HG: CPT | Mod: CPTII,,, | Performed by: REHABILITATION UNIT

## 2024-08-28 PROCEDURE — 99214 OFFICE O/P EST MOD 30 MIN: CPT | Mod: PBBFAC,25

## 2024-08-28 PROCEDURE — 3078F DIAST BP <80 MM HG: CPT | Mod: CPTII,,, | Performed by: REHABILITATION UNIT

## 2024-08-28 RX ORDER — OXYCODONE HYDROCHLORIDE 5 MG/1
5 TABLET ORAL EVERY 8 HOURS PRN
Qty: 21 TABLET | Refills: 0 | Status: SHIPPED | OUTPATIENT
Start: 2024-08-28 | End: 2024-09-04

## 2024-08-28 NOTE — PROGRESS NOTES
Faculty Attestation: Danii Coleman  was seen at [facility] in the Orthopaedic Clinic. Discussed with the resident at the time of the visit. History of Present Illness, Physical Exam, and Assessment and Plan reviewed. Treatment plan is reasonable and appropriate. Compliance with treatment recommendations is important. No procedure was performed.     Julio Cesar Jeffries MD  Orthopaedic Surgery

## 2024-08-28 NOTE — PROGRESS NOTES
[facility] Orthopaedic Surgery Clinic Note    Orthopedic history:   Right ring finger proximal phalanx ORIF (08/15/2024)       Danii Coleman is a 34 y.o. female presents to clinic for routine follow up after the above-mentioned surgery.  She is about 2 weeks out from surgery now.  She has been doing well since surgery.  He has maintained her splint.  Denies new traumas.  No constitutional symptoms of infection.  Doing well.      PMH:   Past Medical History:   Diagnosis Date    Acne     Anxiety disorder, unspecified     Depression     Elevated BP without diagnosis of hypertension     Fatigue     Gallbladder sludge     Headache     OCD (obsessive compulsive disorder)     Shift work sleep disorder        PSH:   Past Surgical History:   Procedure Laterality Date    breast lift      HAND SURGERY Right 2013    OPEN REDUCTION AND INTERNAL FIXATION (ORIF) OF INJURY OF FINGER Right 8/15/2024    Procedure: ORIF, FINGER;  Surgeon: Julio Cesar Phelps MD;  Location: [facility];  Service: Orthopedics;  Laterality: Right;       SH:   Social History     Socioeconomic History    Marital status: Single   Tobacco Use    Smoking status: Former    Smokeless tobacco: Never   Substance and Sexual Activity    Alcohol use: Not Currently    Drug use: Yes     Types: Marijuana     Comment: throughut the day-medical card    Sexual activity: Yes     Partners: Male       FH:   Family History   Problem Relation Name Age of Onset    Arthritis Mother      Goiter Mother      Osteoporosis Mother         Allergies:   Review of patient's allergies indicates:   Allergen Reactions    Milk containing products (dairy)        ROS:  Constitutional- no fever, fatigue, weakness  Eye- no vision loss, eye redness, drainage, or pain  ENMT- no sore throat, ear pain, sinus pain/congestion, nasal congestion/drainage  Respiratory- no cough, wheezing, or shortness of breath  CV- no chest pain, no palpitations, no edema  GI- no N/V/D; no abdominal pain    Physical Exam:  Vitals:     08/28/24 1320   BP: 115/77   Pulse: 75   Resp: 18   Temp: 99.2 °F (37.3 °C)       General: NAD  Cardio: RRR by peripheral pulse  Pulm: Normal WOB on room air, symmetric chest rise  Abd: Soft, NT/ND    MSK:  Right hand:   Surgical incision is clean dry and intact with nylon sutures in place.  No erythema or drainage.  Able to extend and flex the digit with limitations due to stiffness from postoperative immobilization.  Sensation intact to light touch on radial and ulnar aspect of the digit.  Fingertips warm well perfused.    Imaging:   X-ray right hand:  Interval fixation of right ring finger proximal phalanx fracture with acceptable alignment.    Assessment:  34-year-old female s/p right ring finger proximal phalanx fracture ORIF (08/15/2024).    -sutures removed today in clinic.  Replaced with Steri-Strips.  Okay to get the wound wet in the shower, do not submerge the incision until fully healed.  Transition to a removable ulnar gutter wrist brace to be worn at all times except when performing hygiene and working on gentle range of motion of the finger.  Return to clinic in 4 weeks for repeat x-rays and range of motion check.  We would consider hand therapy at that time if she has finger stiffness.  Nonweightbearing right upper extremity.    Favio Barber MD  [facility] Orthopaedic Surgery  8/28/2024 2:40 PM

## 2024-10-02 ENCOUNTER — OFFICE VISIT (OUTPATIENT)
Dept: ORTHOPEDICS | Facility: CLINIC | Age: 34
End: 2024-10-02
Payer: MEDICAID

## 2024-10-02 VITALS — HEART RATE: 72 BPM | OXYGEN SATURATION: 97 % | DIASTOLIC BLOOD PRESSURE: 79 MMHG | SYSTOLIC BLOOD PRESSURE: 125 MMHG

## 2024-10-02 DIAGNOSIS — S62.644A CLOSED NONDISPLACED FRACTURE OF PROXIMAL PHALANX OF RIGHT RING FINGER, INITIAL ENCOUNTER: Primary | ICD-10-CM

## 2024-10-02 PROCEDURE — 3078F DIAST BP <80 MM HG: CPT | Mod: CPTII,,, | Performed by: REHABILITATION UNIT

## 2024-10-02 PROCEDURE — 99213 OFFICE O/P EST LOW 20 MIN: CPT | Mod: PBBFAC

## 2024-10-02 PROCEDURE — 99024 POSTOP FOLLOW-UP VISIT: CPT | Mod: ,,, | Performed by: REHABILITATION UNIT

## 2024-10-02 PROCEDURE — 1159F MED LIST DOCD IN RCRD: CPT | Mod: CPTII,,, | Performed by: REHABILITATION UNIT

## 2024-10-02 PROCEDURE — 3074F SYST BP LT 130 MM HG: CPT | Mod: CPTII,,, | Performed by: REHABILITATION UNIT

## 2024-10-02 NOTE — PROGRESS NOTES
\[facility]\"" Orthopaedic Surgery Clinic Note    Orthopedic history:   Right ring finger proximal phalanx ORIF (08/15/2024)       Danii Coleman is a 34 y.o. female presents to clinic for routine follow up after the above-mentioned surgery.  She is about 6 weeks out from surgery now.  She has been doing well since surgery.  She has been doing range of motion exercises at home.  Denies new traumas.  No constitutional symptoms of infection.  Doing well.      PMH:   Past Medical History:   Diagnosis Date    Acne     Anxiety disorder, unspecified     Depression     Elevated BP without diagnosis of hypertension     Fatigue     Gallbladder sludge     Headache     OCD (obsessive compulsive disorder)     Shift work sleep disorder        PSH:   Past Surgical History:   Procedure Laterality Date    breast lift      HAND SURGERY Right 2013    OPEN REDUCTION AND INTERNAL FIXATION (ORIF) OF INJURY OF FINGER Right 8/15/2024    Procedure: ORIF, FINGER;  Surgeon: Julio Cesar Phelps MD;  Location: [facility];  Service: Orthopedics;  Laterality: Right;       SH:   Social History     Socioeconomic History    Marital status: Single   Tobacco Use    Smoking status: Former    Smokeless tobacco: Never   Substance and Sexual Activity    Alcohol use: Not Currently    Drug use: Yes     Types: Marijuana     Comment: throughut the day-medical card    Sexual activity: Yes     Partners: Male       FH:   Family History   Problem Relation Name Age of Onset    Arthritis Mother      Goiter Mother      Osteoporosis Mother         Allergies:   Review of patient's allergies indicates:   Allergen Reactions    Milk containing products (dairy)        ROS:  Constitutional- no fever, fatigue, weakness  Eye- no vision loss, eye redness, drainage, or pain  ENMT- no sore throat, ear pain, sinus pain/congestion, nasal congestion/drainage  Respiratory- no cough, wheezing, or shortness of breath  CV- no chest pain, no palpitations, no edema  GI- no N/V/D; no abdominal  pain    Physical Exam:  Vitals:    10/02/24 0837   BP: 125/79   Pulse: 72       General: NAD  Cardio: RRR by peripheral pulse  Pulm: Normal WOB on room air, symmetric chest rise  Abd: Soft, NT/ND    MSK:  Right hand:   Surgical incision is clean dry and intact, healing well.  No erythema or drainage.  She was able to fully flex and extend the digit at the M CP, PIP and the IP joints.  No malrotation, no scissoring.  Able to make full composite fist, normal cascade.  Sensation intact to light touch on radial and ulnar aspect of the digit.  Fingertips warm well perfused.    Imaging:   X-ray right hand:  Interval fixation of right ring finger proximal phalanx fracture with anatomic alignment, no hardware loosening.  Healing appropriately.    Assessment:  34-year-old female s/p right ring finger proximal phalanx fracture ORIF (08/15/2024).    -patient is doing well 6 weeks postoperatively.  She has nearly regained full range of motion of the finger.  Still has swelling around the proximal phalanx of the finger.  Discussed that she can start massaging the scar, continue icing and anti-inflammatories p.r.n..  Continue daily range-of-motion exercises at home.  She can slowly progress to weight-bearing as tolerated.  We will make a follow up appointment for her in 6 weeks with repeat x-rays; she can call and cancel if doing well.    Vikash Riggs MD  [facility] Orthopaedic Surgery  10/2/2024 2:40 PM

## 2025-01-15 ENCOUNTER — HOSPITAL ENCOUNTER (OUTPATIENT)
Dept: RADIOLOGY | Facility: HOSPITAL | Age: 35
Discharge: HOME | End: 2025-01-15

## 2025-01-15 ENCOUNTER — OFFICE VISIT (OUTPATIENT)
Dept: URGENT CARE | Facility: CLINIC | Age: 35
End: 2025-01-15
Payer: COMMERCIAL

## 2025-01-15 VITALS
RESPIRATION RATE: 17 BRPM | BODY MASS INDEX: 23.02 KG/M2 | WEIGHT: 134.81 LBS | OXYGEN SATURATION: 98 % | HEART RATE: 87 BPM | DIASTOLIC BLOOD PRESSURE: 84 MMHG | TEMPERATURE: 98 F | HEIGHT: 64 IN | SYSTOLIC BLOOD PRESSURE: 129 MMHG

## 2025-01-15 DIAGNOSIS — S69.92XA HAND INJURY, LEFT, INITIAL ENCOUNTER: ICD-10-CM
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
## 2025-01-15 DIAGNOSIS — S62.617A CLOSED DISPLACED FRACTURE OF PROXIMAL PHALANX OF LEFT LITTLE FINGER, INITIAL ENCOUNTER: Primary | ICD-10-CM

## 2025-01-15 PROCEDURE — 99213 OFFICE O/P EST LOW 20 MIN: CPT | Mod: S$PBB,,,

## 2025-01-15 PROCEDURE — 99215 OFFICE O/P EST HI 40 MIN: CPT | Mod: PBBFAC,25

## 2025-01-15 PROCEDURE — 73130 X-RAY EXAM OF HAND: CPT | Mod: TC,LT

## 2025-01-15 RX ORDER — DICLOFENAC SODIUM 50 MG/1
50 TABLET, DELAYED RELEASE ORAL 2 TIMES DAILY
Qty: 20 TABLET | Refills: 0 | Status: SHIPPED | OUTPATIENT
Start: 2025-01-15 | End: 2025-01-25

## 2025-01-15 RX ORDER — HYDROCODONE BITARTRATE AND ACETAMINOPHEN 5; 325 MG/1; MG/1
1 TABLET ORAL EVERY 6 HOURS PRN
Qty: 20 TABLET | Refills: 0 | Status: SHIPPED | OUTPATIENT
Start: 2025-01-15 | End: 2025-01-20
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
## 2025-01-31 ENCOUNTER — HOSPITAL ENCOUNTER (OUTPATIENT)
Dept: RADIOLOGY | Facility: HOSPITAL | Age: 35
Discharge: HOME | End: 2025-01-31
Attending: ORTHOPAEDIC SURGERY

## 2025-01-31 ENCOUNTER — OFFICE VISIT (OUTPATIENT)
Dept: ORTHOPEDICS | Facility: CLINIC | Age: 35
End: 2025-01-31

## 2025-01-31 VITALS
WEIGHT: 138 LBS | TEMPERATURE: 99 F | BODY MASS INDEX: 24.45 KG/M2 | HEART RATE: 72 BPM | DIASTOLIC BLOOD PRESSURE: 86 MMHG | HEIGHT: 63 IN | SYSTOLIC BLOOD PRESSURE: 135 MMHG

## 2025-01-31 DIAGNOSIS — S62.92XA LEFT HAND FRACTURE, CLOSED, INITIAL ENCOUNTER: ICD-10-CM

## 2025-01-31 DIAGNOSIS — S62.92XA LEFT HAND FRACTURE, CLOSED, INITIAL ENCOUNTER: Primary | ICD-10-CM

## 2025-01-31 PROCEDURE — 99499 UNLISTED E&M SERVICE: CPT | Mod: S$PBB,57,, | Performed by: ORTHOPAEDIC SURGERY

## 2025-01-31 PROCEDURE — 99214 OFFICE O/P EST MOD 30 MIN: CPT | Mod: PBBFAC,25

## 2025-01-31 PROCEDURE — 73130 X-RAY EXAM OF HAND: CPT | Mod: TC,LT

## 2025-01-31 RX ORDER — MUPIROCIN 20 MG/G
OINTMENT TOPICAL
OUTPATIENT
Start: 2025-01-31
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
## 2025-01-31 NOTE — PROGRESS NOTES
Faculty Attestation: Danii Coleman  was seen at [facility] in the Orthopaedic Clinic. Discussed with the resident at the time of the visit. History of Present Illness, Physical Exam, and Assessment and Plan reviewed. Treatment plan is reasonable and appropriate. Compliance with treatment recommendations is important. No procedure was performed. Agree with open reduction and internal fixation.

## 2025-02-03 ENCOUNTER — ANESTHESIA EVENT (OUTPATIENT)
Dept: SURGERY | Facility: HOSPITAL | Age: 35
End: 2025-02-03
Payer: COMMERCIAL
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
## 2025-02-05 ENCOUNTER — PATIENT MESSAGE (OUTPATIENT)
Dept: SURGERY | Facility: HOSPITAL | Age: 35
End: 2025-02-05
Payer: MEDICAID
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
## 2025-02-06 ENCOUNTER — HOSPITAL ENCOUNTER (OUTPATIENT)
Facility: HOSPITAL | Age: 35
Discharge: HOME | End: 2025-02-06
Attending: ORTHOPAEDIC SURGERY | Admitting: ORTHOPAEDIC SURGERY
Payer: COMMERCIAL

## 2025-02-06 ENCOUNTER — ANESTHESIA (OUTPATIENT)
Dept: SURGERY | Facility: HOSPITAL | Age: 35
End: 2025-02-06
Payer: COMMERCIAL

## 2025-02-06 DIAGNOSIS — S62.92XA LEFT HAND FRACTURE, CLOSED, INITIAL ENCOUNTER: ICD-10-CM

## 2025-02-06 DIAGNOSIS — S62.617A CLOSED DISPLACED FRACTURE OF PROXIMAL PHALANX OF LEFT LITTLE FINGER, INITIAL ENCOUNTER: Primary | ICD-10-CM

## 2025-02-06 LAB
B-HCG UR QL: NEGATIVE
CTP QC/QA: YES

## 2025-02-06 PROCEDURE — 63600175 PHARM REV CODE 636 W HCPCS: Performed by: SPECIALIST

## 2025-02-06 PROCEDURE — 27201423 OPTIME MED/SURG SUP & DEVICES STERILE SUPPLY: Performed by: ORTHOPAEDIC SURGERY

## 2025-02-06 PROCEDURE — D9220A PRA ANESTHESIA: Mod: CRNA,,, | Performed by: NURSE ANESTHETIST, CERTIFIED REGISTERED

## 2025-02-06 PROCEDURE — 37000009 HC ANESTHESIA EA ADD 15 MINS: Performed by: ORTHOPAEDIC SURGERY

## 2025-02-06 PROCEDURE — 71000015 HC POSTOP RECOV 1ST HR: Performed by: ORTHOPAEDIC SURGERY

## 2025-02-06 PROCEDURE — D9220A PRA ANESTHESIA: Mod: ANES,,, | Performed by: SPECIALIST

## 2025-02-06 PROCEDURE — 37000008 HC ANESTHESIA 1ST 15 MINUTES: Performed by: ORTHOPAEDIC SURGERY

## 2025-02-06 PROCEDURE — 36000708 HC OR TIME LEV III 1ST 15 MIN: Performed by: ORTHOPAEDIC SURGERY

## 2025-02-06 PROCEDURE — 81025 URINE PREGNANCY TEST: CPT | Performed by: NURSE PRACTITIONER

## 2025-02-06 PROCEDURE — 71000033 HC RECOVERY, INTIAL HOUR: Performed by: ORTHOPAEDIC SURGERY

## 2025-02-06 PROCEDURE — 25000003 PHARM REV CODE 250: Performed by: NURSE ANESTHETIST, CERTIFIED REGISTERED

## 2025-02-06 PROCEDURE — C1713 ANCHOR/SCREW BN/BN,TIS/BN: HCPCS | Performed by: ORTHOPAEDIC SURGERY

## 2025-02-06 PROCEDURE — 63600175 PHARM REV CODE 636 W HCPCS: Performed by: NURSE ANESTHETIST, CERTIFIED REGISTERED

## 2025-02-06 PROCEDURE — 36000709 HC OR TIME LEV III EA ADD 15 MIN: Performed by: ORTHOPAEDIC SURGERY

## 2025-02-06 RX ORDER — SODIUM CHLORIDE, SODIUM LACTATE, POTASSIUM CHLORIDE, CALCIUM CHLORIDE 600; 310; 30; 20 MG/100ML; MG/100ML; MG/100ML; MG/100ML
INJECTION, SOLUTION INTRAVENOUS CONTINUOUS
Status: DISCONTINUED | OUTPATIENT
Start: 2025-02-06 | End: 2025-02-06 | Stop reason: HOSPADM

## 2025-02-06 RX ORDER — IPRATROPIUM BROMIDE AND ALBUTEROL SULFATE 2.5; .5 MG/3ML; MG/3ML
3 SOLUTION RESPIRATORY (INHALATION) ONCE AS NEEDED
Status: DISCONTINUED | OUTPATIENT
Start: 2025-02-06 | End: 2025-02-06 | Stop reason: HOSPADM

## 2025-02-06 RX ORDER — PROCHLORPERAZINE EDISYLATE 5 MG/ML
5 INJECTION INTRAMUSCULAR; INTRAVENOUS ONCE AS NEEDED
Status: DISCONTINUED | OUTPATIENT
Start: 2025-02-06 | End: 2025-02-06 | Stop reason: HOSPADM

## 2025-02-06 RX ORDER — MUPIROCIN 20 MG/G
OINTMENT TOPICAL
Status: DISCONTINUED | OUTPATIENT
Start: 2025-02-06 | End: 2025-02-06 | Stop reason: HOSPADM

## 2025-02-06 RX ORDER — DEXAMETHASONE SODIUM PHOSPHATE 4 MG/ML
INJECTION, SOLUTION INTRA-ARTICULAR; INTRALESIONAL; INTRAMUSCULAR; INTRAVENOUS; SOFT TISSUE
Status: DISCONTINUED | OUTPATIENT
Start: 2025-02-06 | End: 2025-02-06

## 2025-02-06 RX ORDER — MEPERIDINE HYDROCHLORIDE 25 MG/ML
12.5 INJECTION INTRAMUSCULAR; INTRAVENOUS; SUBCUTANEOUS ONCE
Status: DISCONTINUED | OUTPATIENT
Start: 2025-02-06 | End: 2025-02-06 | Stop reason: HOSPADM

## 2025-02-06 RX ORDER — PROPOFOL 10 MG/ML
INJECTION, EMULSION INTRAVENOUS
Status: DISCONTINUED | OUTPATIENT
Start: 2025-02-06 | End: 2025-02-06

## 2025-02-06 RX ORDER — DEXMEDETOMIDINE HYDROCHLORIDE 100 UG/ML
INJECTION, SOLUTION INTRAVENOUS
Status: DISCONTINUED | OUTPATIENT
Start: 2025-02-06 | End: 2025-02-06

## 2025-02-06 RX ORDER — LIDOCAINE HYDROCHLORIDE 20 MG/ML
INJECTION INTRAVENOUS
Status: DISCONTINUED | OUTPATIENT
Start: 2025-02-06 | End: 2025-02-06

## 2025-02-06 RX ORDER — MIDAZOLAM HYDROCHLORIDE 2 MG/2ML
2 INJECTION, SOLUTION INTRAMUSCULAR; INTRAVENOUS ONCE
Status: COMPLETED | OUTPATIENT
Start: 2025-02-06 | End: 2025-02-06

## 2025-02-06 RX ORDER — DIPHENHYDRAMINE HYDROCHLORIDE 50 MG/ML
25 INJECTION INTRAMUSCULAR; INTRAVENOUS ONCE AS NEEDED
Status: DISCONTINUED | OUTPATIENT
Start: 2025-02-06 | End: 2025-02-06 | Stop reason: HOSPADM

## 2025-02-06 RX ORDER — CEFAZOLIN SODIUM 1 G/3ML
INJECTION, POWDER, FOR SOLUTION INTRAMUSCULAR; INTRAVENOUS
Status: DISCONTINUED | OUTPATIENT
Start: 2025-02-06 | End: 2025-02-06

## 2025-02-06 RX ORDER — FENTANYL CITRATE 50 UG/ML
INJECTION, SOLUTION INTRAMUSCULAR; INTRAVENOUS
Status: DISCONTINUED | OUTPATIENT
Start: 2025-02-06 | End: 2025-02-06

## 2025-02-06 RX ORDER — OXYCODONE HYDROCHLORIDE 5 MG/1
5 TABLET ORAL EVERY 4 HOURS PRN
Qty: 30 TABLET | Refills: 0 | Status: SHIPPED | OUTPATIENT
Start: 2025-02-06 | End: 2025-02-13

## 2025-02-06 RX ORDER — OXYCODONE AND ACETAMINOPHEN 5; 325 MG/1; MG/1
2 TABLET ORAL ONCE
Status: DISCONTINUED | OUTPATIENT
Start: 2025-02-06 | End: 2025-02-06 | Stop reason: HOSPADM

## 2025-02-06 RX ORDER — ACETAMINOPHEN 500 MG
500 TABLET ORAL EVERY 6 HOURS PRN
Qty: 30 TABLET | Refills: 0 | Status: SHIPPED | OUTPATIENT
Start: 2025-02-06

## 2025-02-06 RX ORDER — GABAPENTIN 300 MG/1
300 CAPSULE ORAL 3 TIMES DAILY
Qty: 90 CAPSULE | Refills: 0 | Status: SHIPPED | OUTPATIENT
Start: 2025-02-06 | End: 2025-03-08

## 2025-02-06 RX ORDER — KETOROLAC TROMETHAMINE 30 MG/ML
INJECTION, SOLUTION INTRAMUSCULAR; INTRAVENOUS
Status: DISCONTINUED | OUTPATIENT
Start: 2025-02-06 | End: 2025-02-06

## 2025-02-06 RX ORDER — ONDANSETRON HYDROCHLORIDE 2 MG/ML
INJECTION, SOLUTION INTRAVENOUS
Status: DISCONTINUED | OUTPATIENT
Start: 2025-02-06 | End: 2025-02-06

## 2025-02-06 RX ORDER — HYDROMORPHONE HYDROCHLORIDE 1 MG/ML
0.5 INJECTION, SOLUTION INTRAMUSCULAR; INTRAVENOUS; SUBCUTANEOUS EVERY 5 MIN PRN
Status: DISCONTINUED | OUTPATIENT
Start: 2025-02-06 | End: 2025-02-06 | Stop reason: HOSPADM

## 2025-02-06 RX ORDER — IBUPROFEN 800 MG/1
800 TABLET ORAL EVERY 6 HOURS PRN
Qty: 30 TABLET | Refills: 0 | Status: SHIPPED | OUTPATIENT
Start: 2025-02-06

## 2025-02-06 RX ORDER — METHOCARBAMOL 500 MG/1
500 TABLET, FILM COATED ORAL 3 TIMES DAILY
Qty: 30 TABLET | Refills: 0 | Status: SHIPPED | OUTPATIENT
Start: 2025-02-06 | End: 2025-02-16

## 2025-02-06 RX ORDER — HYDROMORPHONE HYDROCHLORIDE 1 MG/ML
0.2 INJECTION, SOLUTION INTRAMUSCULAR; INTRAVENOUS; SUBCUTANEOUS EVERY 5 MIN PRN
Status: DISCONTINUED | OUTPATIENT
Start: 2025-02-06 | End: 2025-02-06 | Stop reason: HOSPADM

## 2025-02-06 RX ORDER — ONDANSETRON HYDROCHLORIDE 2 MG/ML
4 INJECTION, SOLUTION INTRAVENOUS ONCE
Status: DISCONTINUED | OUTPATIENT
Start: 2025-02-06 | End: 2025-02-06 | Stop reason: HOSPADM

## 2025-02-06 RX ORDER — GLUCAGON 1 MG
1 KIT INJECTION
Status: DISCONTINUED | OUTPATIENT
Start: 2025-02-06 | End: 2025-02-06 | Stop reason: HOSPADM

## 2025-02-06 RX ADMIN — LIDOCAINE HYDROCHLORIDE 100 MG: 20 INJECTION INTRAVENOUS at 07:02

## 2025-02-06 RX ADMIN — PROPOFOL 50 MG: 10 INJECTION, EMULSION INTRAVENOUS at 07:02

## 2025-02-06 RX ADMIN — DEXMEDETOMIDINE 20 MCG: 200 INJECTION, SOLUTION INTRAVENOUS at 10:02

## 2025-02-06 RX ADMIN — FENTANYL CITRATE 50 MCG: 50 INJECTION INTRAMUSCULAR; INTRAVENOUS at 08:02

## 2025-02-06 RX ADMIN — KETOROLAC TROMETHAMINE 30 MG: 30 INJECTION, SOLUTION INTRAMUSCULAR at 08:02

## 2025-02-06 RX ADMIN — HYDROMORPHONE HYDROCHLORIDE 0.5 MG: 1 INJECTION, SOLUTION INTRAMUSCULAR; INTRAVENOUS; SUBCUTANEOUS at 10:02

## 2025-02-06 RX ADMIN — ONDANSETRON 4 MG: 2 INJECTION INTRAMUSCULAR; INTRAVENOUS at 10:02

## 2025-02-06 RX ADMIN — MIDAZOLAM HYDROCHLORIDE 2 MG: 1 INJECTION, SOLUTION INTRAMUSCULAR; INTRAVENOUS at 07:02

## 2025-02-06 RX ADMIN — PROPOFOL 150 MG: 10 INJECTION, EMULSION INTRAVENOUS at 07:02

## 2025-02-06 RX ADMIN — DEXAMETHASONE SODIUM PHOSPHATE 4 MG: 4 INJECTION, SOLUTION INTRA-ARTICULAR; INTRALESIONAL; INTRAMUSCULAR; INTRAVENOUS; SOFT TISSUE at 08:02

## 2025-02-06 RX ADMIN — CEFAZOLIN 2 G: 330 INJECTION, POWDER, FOR SOLUTION INTRAMUSCULAR; INTRAVENOUS at 08:02

## 2025-02-06 NOTE — DISCHARGE INSTRUCTIONS
Keep dressing/splint  on and clean/dry until follow up appointment in the ortho clinic.  NO lifting with surgical hand.  Patient and should move/wiggle fingers.

## 2025-02-06 NOTE — OP NOTE
[facility]  Surgery Department  Operative Note    SUMMARY     Date of Procedure: 2/6/2025     Procedure: Procedure(s) (LRB):  ORIF, FIFTH PROXIMAL PHALANX (Left)     Surgeons and Role:     * Randy Daugherty MD - Primary     * Favio Rodriguez MD - Resident - Assisting        Pre-Operative Diagnosis: Left fifth proximal phalanx fracture     Post-Operative Diagnosis: Post-Op Diagnosis Codes:     * Left hand fracture, closed, initial encounter [S62.92XA]    Anesthesia: General    Operative Findings (including complications, if any):  Fracture of the 5th proximal phalanx    Description of Technical Procedures:  Patient was taken to the OR remained supine on the stretcher.  The left upper extremity was prepped and draped in normal sterile fashion.  Time-out was held in the correct patient, extremity, and procedure were confirmed.  Preoperative antibiotics were given.  The limb was exsanguinated and the tourniquet was inflated to 250 mmHg.      An incision was made dorsally directly over the proximal phalanx of the 5th digit.  The extensor tendon was exposed.  The extensor tendon was split in line with the fibers..  The tendon was protected.  The fracture was identified and freed.  It was cleaned with curette and rongeur.  The fracture was then provisionally reduced with a pointed reduction clamp and 2 K-wires were placed across the fracture holding it reduced.  There was a small area on the dorsal aspect of the proximal phalanx articular surface that was devoid of cartilage from the injury.  Once fluoroscopy confirmed appropriate placement of the wires and reduction of the articular surface, an Acumed nanophix wire was drilled across the fracture using the assistance of fluoroscopy.  The wire was measured.  The wire was then advanced through the fracture and an 8mm 1.5 mm screw was advanced across the fracture and under direct visualization.  Appropriate purchase was obtained.  Fluoroscopy  confirmed appropriate placement of the screw.  We then attempted to place a 2nd screw distal to the first one however the fragment of bone was too small and it created a small fracture through the spike. Final fluoroscopy confirmed appropriate reduction and placement of hardware. The wound was irrigated.  The tendon was repaired with 3-0 Vicryl suture.  The wound was closed with 3-0 nylon suture.  A sterile dressing Xeroform 4x4s cast padding and an ulnar gutter splint was applied.      Estimated Blood Loss (EBL): Minimal           Implants:   Implant Name Type Inv. Item Serial No.  Lot No. LRB No. Used Action   [employer] K-Wire [id number]      Left 2 Implanted and Explanted   Nano[employer]ix Implant [id number]    [employer] [id number] Left 1 Implanted and Explanted   [employer]ix Implant [id number]    [employer] [id number] Left 1 Implanted   [employer] Implant [id number]    [employer] [id number] Left 1 Implanted and Explanted       Specimens:   Specimen (24h ago, onward)      None                    Condition: Good    Disposition: PACU - hemodynamically stable.    Postop plan   Follow up clinic 2 weeks for wound check suture removal   Nonweightbearing left upper extremity 6 weeks postop    Favio Rodriguez

## 2025-02-06 NOTE — DISCHARGE SUMMARY
[facility]  Brief Operative Note    Surgery Date:  2/6/2025    Surgeon(s) and Role:     Randy Daugherty - Primary    Assisting Surgeon:  Favio Rodriguez MD    Pre-op Diagnosis:  Fracture of left 5th proximal phalanx    Post-op Diagnosis:    Post-Op Diagnosis Codes:     * Left hand fracture, closed, initial encounter [S62.92XA]    Procedure(s) (LRB):  ORIF, FIFTH PROXIMAL PHALANX (Left)    Anesthesia: Choice    Operative Findings:  Fracture of left 5th proximal phalanx    Estimated Blood Loss: see full op note          Specimens:   Specimen (24h ago, onward)      None              Discharge Note    OUTCOME: Patient tolerated treatment/procedure well without complication and is now ready for discharge.    DISPOSITION: Home or Self Care    FINAL DIAGNOSIS:  Fracture of left 5th proximal phalanx    FOLLOWUP: In clinic    DISCHARGE INSTRUCTIONS:  Keep dressing clean, dry, intact. Take prescribed medications as needed for pain. No weight bearing through the operative extremity.

## 2025-02-06 NOTE — TRANSFER OF CARE
Anesthesia Transfer of Care Note    Patient: Danii Coleman    Procedure(s) Performed: Procedure(s) (LRB):  ORIF, FIFTH PROXIMAL PHALANX (Left)    Patient location: PACU    Anesthesia Type: general    Transport from OR: Transported from OR on room air with adequate spontaneous ventilation    Post pain: adequate analgesia    Post assessment: no apparent anesthetic complications and tolerated procedure well    Post vital signs: stable    Level of consciousness: sedated    Nausea/Vomiting: no nausea/vomiting    Complications: none    Transfer of care protocol was followed      Last vitals: Visit Vitals  /74   Pulse 61   Temp 36.6 °C (97.8 °F) (Temporal)   Resp 15   Wt 62.1 kg (136 lb 12.8 oz)   LMP 01/15/2025 (Exact Date)   SpO2 100%   Breastfeeding No   BMI 24.23 kg/m²

## 2025-02-06 NOTE — ANESTHESIA PROCEDURE NOTES
Intubation    Date/Time: 2/6/2025 8:01 AM    Performed by: Annamarie Serrano CRNA  Authorized by: Nelly Acosta MD    Intubation:     Induction:  Intravenous    Intubated:  Postinduction    Mask Ventilation:  Not attempted    Attempts:  1    Attempted By:  CRNA    Difficult Airway Encountered?: No      Complications:  None    Airway Device:  Supraglottic airway/LMA    Airway Device Size:  3.0    Style/Cuff Inflation:  Uncuffed    Placement Verified By:  Capnometry    Complicating Factors:  None    Findings Post-Intubation:  BS equal bilateral and atraumatic/condition of teeth unchanged

## 2025-02-07 VITALS
RESPIRATION RATE: 20 BRPM | TEMPERATURE: 98 F | SYSTOLIC BLOOD PRESSURE: 121 MMHG | BODY MASS INDEX: 24.23 KG/M2 | OXYGEN SATURATION: 98 % | DIASTOLIC BLOOD PRESSURE: 79 MMHG | HEART RATE: 74 BPM | WEIGHT: 136.81 LBS

## 2025-02-07 NOTE — ANESTHESIA POSTPROCEDURE EVALUATION
Anesthesia Post Evaluation    Patient: Danii Coleman    Procedure(s) Performed: Procedure(s) (LRB):  ORIF, FIFTH PROXIMAL PHALANX (Left)    Final Anesthesia Type: general      Patient location during evaluation: PACU  Patient participation: Yes- Able to Participate  Level of consciousness: awake and responds to stimulation  Post-procedure vital signs: reviewed and stable  Pain management: adequate  Airway patency: patent    PONV status at discharge: No PONV  Anesthetic complications: no      Cardiovascular status: blood pressure returned to baseline  Respiratory status: unassisted  Hydration status: euvolemic  Follow-up not needed.          Vitals Value Taken Time   /79 02/06/25 1114   Temp 36.5 °C (97.7 °F) 02/06/25 1114   Pulse 74 02/06/25 1114   Resp 20 02/06/25 1114   SpO2 98 % 02/06/25 1114         Event Time   Out of Recovery 10:27:00         Pain/Tomás Score: Pain Rating Prior to Med Admin: 6 (2/6/2025 10:20 AM)  Tomás Score: 9 (2/6/2025 10:30 AM)  Modified Tomás Score: 19 (2/6/2025 11:13 AM)

## 2025-02-19 ENCOUNTER — HOSPITAL ENCOUNTER (OUTPATIENT)
Dept: RADIOLOGY | Facility: HOSPITAL | Age: 35
Discharge: HOME | End: 2025-02-19
Attending: ORTHOPAEDIC SURGERY
Payer: MEDICAID

## 2025-02-19 ENCOUNTER — OFFICE VISIT (OUTPATIENT)
Dept: ORTHOPEDICS | Facility: CLINIC | Age: 35
End: 2025-02-19
Payer: MEDICAID

## 2025-02-19 VITALS
HEIGHT: 63 IN | DIASTOLIC BLOOD PRESSURE: 81 MMHG | WEIGHT: 136.88 LBS | SYSTOLIC BLOOD PRESSURE: 135 MMHG | TEMPERATURE: 98 F | HEART RATE: 72 BPM | BODY MASS INDEX: 24.25 KG/M2

## 2025-02-19 DIAGNOSIS — M79.642 LEFT HAND PAIN: ICD-10-CM

## 2025-02-19 DIAGNOSIS — M79.642 LEFT HAND PAIN: Primary | ICD-10-CM

## 2025-02-19 PROCEDURE — 73140 X-RAY EXAM OF FINGER(S): CPT | Mod: TC

## 2025-02-19 PROCEDURE — 99213 OFFICE O/P EST LOW 20 MIN: CPT | Mod: PBBFAC,25
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
## 2025-02-19 NOTE — PROGRESS NOTES
Faculty Attestation: Danii Coleman  was seen at [facility] in the Orthopaedic Clinic in the outpatient department at a [facility]. Discussed with the resident at the time of the visit.  I participated in the management of the patient and was immediately available throughout the encounter. History of Present Illness, Physical Exam, and Assessment and Plan reviewed. Treatment plan is reasonable and appropriate. Compliance with treatment recommendations is important. No procedure was performed.     Randy Daugherty MD  Orthopedic Surgery Chief

## 2025-03-24 ENCOUNTER — OFFICE VISIT (OUTPATIENT)
Dept: ORTHOPEDICS | Facility: CLINIC | Age: 35
End: 2025-03-24
Payer: MEDICAID

## 2025-03-24 ENCOUNTER — HOSPITAL ENCOUNTER (OUTPATIENT)
Dept: RADIOLOGY | Facility: HOSPITAL | Age: 35
Discharge: HOME | End: 2025-03-24
Payer: MEDICAID

## 2025-03-24 VITALS
HEIGHT: 63 IN | OXYGEN SATURATION: 95 % | BODY MASS INDEX: 24.53 KG/M2 | WEIGHT: 138.44 LBS | TEMPERATURE: 99 F | RESPIRATION RATE: 20 BRPM | SYSTOLIC BLOOD PRESSURE: 126 MMHG | DIASTOLIC BLOOD PRESSURE: 83 MMHG | HEART RATE: 87 BPM

## 2025-03-24 DIAGNOSIS — M79.642 LEFT HAND PAIN: Primary | ICD-10-CM

## 2025-03-24 DIAGNOSIS — M79.642 LEFT HAND PAIN: ICD-10-CM

## 2025-03-24 PROCEDURE — 3079F DIAST BP 80-89 MM HG: CPT | Mod: CPTII,,, | Performed by: ORTHOPAEDIC SURGERY

## 2025-03-24 PROCEDURE — 99024 POSTOP FOLLOW-UP VISIT: CPT | Mod: ,,, | Performed by: ORTHOPAEDIC SURGERY

## 2025-03-24 PROCEDURE — 99214 OFFICE O/P EST MOD 30 MIN: CPT | Mod: PBBFAC,25

## 2025-03-24 PROCEDURE — 73140 X-RAY EXAM OF FINGER(S): CPT | Mod: TC,LT

## 2025-03-24 PROCEDURE — 3074F SYST BP LT 130 MM HG: CPT | Mod: CPTII,,, | Performed by: ORTHOPAEDIC SURGERY

## 2025-03-24 PROCEDURE — 1159F MED LIST DOCD IN RCRD: CPT | Mod: CPTII,,, | Performed by: ORTHOPAEDIC SURGERY
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
## 2025-03-24 NOTE — PROGRESS NOTES
Faculty Attestation: Danii Coleman  was seen at [facility] in the Orthopaedic Clinic. Discussed with the resident at the time of the visit. History of Present Illness, Physical Exam, and Assessment and Plan reviewed. Treatment plan is reasonable and appropriate. Compliance with treatment recommendations is important. No procedure was performed.     Julio Cesar Phelps MD  Orthopaedic Surgery

## 2025-04-03 ENCOUNTER — OFFICE VISIT (OUTPATIENT)
Dept: URGENT CARE | Facility: CLINIC | Age: 35
End: 2025-04-03
Payer: MEDICAID

## 2025-04-03 ENCOUNTER — HOSPITAL ENCOUNTER (OUTPATIENT)
Dept: RADIOLOGY | Facility: HOSPITAL | Age: 35
Discharge: HOME | End: 2025-04-03
Payer: MEDICAID

## 2025-04-03 VITALS
WEIGHT: 136 LBS | TEMPERATURE: 99 F | SYSTOLIC BLOOD PRESSURE: 138 MMHG | BODY MASS INDEX: 24.1 KG/M2 | OXYGEN SATURATION: 99 % | RESPIRATION RATE: 20 BRPM | HEIGHT: 63 IN | HEART RATE: 82 BPM | DIASTOLIC BLOOD PRESSURE: 87 MMHG

## 2025-04-03 DIAGNOSIS — Z48.02 VISIT FOR SUTURE REMOVAL: ICD-10-CM

## 2025-04-03 DIAGNOSIS — M25.442 FINGER JOINT SWELLING, LEFT: Primary | ICD-10-CM

## 2025-04-03 DIAGNOSIS — M25.442 FINGER JOINT SWELLING, LEFT: ICD-10-CM

## 2025-04-03 PROCEDURE — 99213 OFFICE O/P EST LOW 20 MIN: CPT | Mod: S$PBB,,,

## 2025-04-03 PROCEDURE — 73140 X-RAY EXAM OF FINGER(S): CPT | Mod: TC

## 2025-04-03 PROCEDURE — 15853 REMOVAL SUTR/STAPL XREQ ANES: CPT | Mod: ,,,

## 2025-04-03 PROCEDURE — 99214 OFFICE O/P EST MOD 30 MIN: CPT | Mod: PBBFAC,25

## 2025-04-03 RX ORDER — DOXYCYCLINE HYCLATE 100 MG/1
100 TABLET, DELAYED RELEASE ORAL DAILY
COMMUNITY

## 2025-04-03 NOTE — PROGRESS NOTES
"Subjective:       Patient ID: Danii Coleman is a 34 y.o. female.    Vitals:  height is 5' 3" (1.6 m) and weight is 61.7 kg (136 lb). Her oral temperature is 98.8 °F (37.1 °C). Her blood pressure is 138/87 and her pulse is 82. Her respiration is 20 and oxygen saturation is 99%.     Chief Complaint: Hand Pain (Left pinky pain. Patient states she had surgery on 2/6 and a stitch was left in. Finger is red and swollen. Describes pain as achy and constant.)    35 y/o white female c/o left fifth finger pain worsening on last night, she reports 2 months post fracture surgery. States was told a single stitch was remained and should eventually work itself out. Patient reports attempted to remove retaining stitch at home with no success.     Hand Pain         Musculoskeletal:  Positive for joint pain and joint swelling.   Skin:  Positive for erythema.       Objective:      Physical Exam   Constitutional: She is oriented to person, place, and time. She is cooperative. She is easily aroused. awake  Abdominal: Normal appearance.   Musculoskeletal:        Hands:    Neurological: She is alert, oriented to person, place, and time and easily aroused. GCS eye subscore is 4. GCS verbal subscore is 5. GCS motor subscore is 6.   Skin: Skin is warm, dry and intact. Capillary refill takes less than 2 seconds. erythema   Psychiatric: Her speech is normal and behavior is normal.   Nursing note and vitals reviewed.        Assessment:       1. Finger joint swelling, left    2. Visit for suture removal          Plan:    Images reviewed, hardware appears to be intact. Single black suture removed from MCP joint of left fifth digit with tweezers and 11 inch scalpel. Patient tolerated well. Band-aid applied. Encouraged to keep upcoming ortho appointment.      Finger joint swelling, left  -     XR FINGER 2 OR MORE VIEWS; Future; Expected date: 04/03/2025    Visit for suture removal                        "

## 2025-04-03 NOTE — PROCEDURES
Suture Removal    Date/Time: 4/3/2025 12:20 PM  Location procedure was performed: [facility]    Performed by: Courtney Guadalupe FNP  Authorized by: Courtney Guadalupe FNP  Description of findings: single suture embedded   Wound Appearance: clean and well healed  Sutures Removed: 1  Staples Removed: 0  Post-removal: bandaid applied  Technical procedures used: suture remvoal kit and 11 inch scapel  Complications: No  Estimated blood loss (mL): 0  Specimens: No  Implants: No  Patient tolerance: Patient tolerated the procedure well with no immediate complications

## 2025-05-05 ENCOUNTER — HOSPITAL ENCOUNTER (OUTPATIENT)
Dept: RADIOLOGY | Facility: HOSPITAL | Age: 35
Discharge: HOME | End: 2025-05-05
Attending: ORTHOPAEDIC SURGERY
Payer: MEDICAID

## 2025-05-05 ENCOUNTER — OFFICE VISIT (OUTPATIENT)
Dept: ORTHOPEDICS | Facility: CLINIC | Age: 35
End: 2025-05-05
Payer: MEDICAID

## 2025-05-05 VITALS
HEIGHT: 63 IN | WEIGHT: 136 LBS | DIASTOLIC BLOOD PRESSURE: 70 MMHG | SYSTOLIC BLOOD PRESSURE: 108 MMHG | BODY MASS INDEX: 24.1 KG/M2 | TEMPERATURE: 99 F

## 2025-05-05 DIAGNOSIS — M79.642 LEFT HAND PAIN: ICD-10-CM

## 2025-05-05 DIAGNOSIS — M79.642 LEFT HAND PAIN: Primary | ICD-10-CM

## 2025-05-05 DIAGNOSIS — Z09 POSTOP CHECK: ICD-10-CM

## 2025-05-05 PROCEDURE — 73130 X-RAY EXAM OF HAND: CPT | Mod: TC,LT

## 2025-05-05 PROCEDURE — 99213 OFFICE O/P EST LOW 20 MIN: CPT | Mod: PBBFAC,25

## 2025-05-05 NOTE — PROGRESS NOTES
Faculty Attestation: Danii Coleman  was seen at [facility] in the Orthopaedic Clinic in the outpatient department at a [facility].  Case was discussed with the resident.  I participated in the management of the patient and was immediately available throughout the encounter. History of Present Illness, Physical Exam, and Assessment and Plan reviewed. Treatment plan is reasonable and appropriate. Compliance with treatment recommendations is important. No procedure was performed.     Randy Daugherty MD  Orthopedic Surgery Chief
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
## (undated) DEVICE — SPLINT PLASTER FAST SET 5X30IN

## (undated) DEVICE — DRESSING GAUZE XEROFORM 5X9

## (undated) DEVICE — SUT 3/0 18IN COATED VICRYLP

## (undated) DEVICE — Device

## (undated) DEVICE — DRAPE HAND STERILE

## (undated) DEVICE — GLOVE SIGNATURE MICRO LTX 7.5

## (undated) DEVICE — SUT 3-0 ETHILON 18 FS-1

## (undated) DEVICE — MANIFOLD 4 PORT

## (undated) DEVICE — BANDAGE SWIFTWRAP ELAS 4INX5YD

## (undated) DEVICE — APPLICATOR CHLORAPREP ORN 26ML

## (undated) DEVICE — GOWN POLY REINF BRTH SLV XL

## (undated) DEVICE — GOWN POLY REINF X-LONG 2XL

## (undated) DEVICE — GLOVE SENSICARE PI GRN 8.5

## (undated) DEVICE — TOURNIQUET SB QC DP 18X4IN

## (undated) DEVICE — BUCKET PLASTER DISPOSABLE

## (undated) DEVICE — SPONGE GAUZE 16PLY 4X4

## (undated) DEVICE — SOL NACL IRR 1000ML BTL

## (undated) DEVICE — GLOVE SENSICARE PI GRN 7

## (undated) DEVICE — GLOVE SIGNATURE MICRO LTX 7

## (undated) DEVICE — GLOVE SIGNATURE MICRO LTX 8

## (undated) DEVICE — SPONGE GAUZE 4X4 12 PLY STRL

## (undated) DEVICE — CORD BIPOLAR 12 FOOT

## (undated) DEVICE — ELECTRODE PATIENT RETURN DISP

## (undated) DEVICE — KIT BASIC ORTHO UNIVERSITY

## (undated) DEVICE — SUT ETHILON 4-0 PS2 18 BLK

## (undated) DEVICE — GLOVE SENSICARE PI GRN 6.5

## (undated) DEVICE — DRAPE C-ARM COVER EZ 36X28IN

## (undated) DEVICE — GLOVE SIGNATURE MICRO LTX 8.5

## (undated) DEVICE — GLOVE SIGNATURE MICRO LTX 6.5

## (undated) DEVICE — KIT SURGICAL TURNOVER

## (undated) DEVICE — BLADE SURG STAINLESS STEEL #15

## (undated) DEVICE — GLOVE SENSICARE NEOPRENE 6.5

## (undated) DEVICE — BANDAGE ESMARK ELASTIC ST 4X9

## (undated) DEVICE — PENCIL ELECSURG ROCKER 15FT

## (undated) DEVICE — SUT VICRYL 3-0 27 SH

## (undated) DEVICE — GLOVE SENSICARE PI GRN 8

## (undated) DEVICE — GAUZE XEROFORM NONADH 5X9IN

## (undated) DEVICE — PADDING WYTEX UNDRCST 4INX4YD

## (undated) DEVICE — GLOVE SENSICARE PI GRN 7.5